# Patient Record
Sex: MALE | Race: WHITE | NOT HISPANIC OR LATINO | URBAN - METROPOLITAN AREA
[De-identification: names, ages, dates, MRNs, and addresses within clinical notes are randomized per-mention and may not be internally consistent; named-entity substitution may affect disease eponyms.]

---

## 2023-02-27 ENCOUNTER — EMERGENCY (EMERGENCY)
Facility: HOSPITAL | Age: 38
LOS: 1 days | Discharge: HOME | End: 2023-02-27
Admitting: STUDENT IN AN ORGANIZED HEALTH CARE EDUCATION/TRAINING PROGRAM
Payer: COMMERCIAL

## 2023-02-27 VITALS
OXYGEN SATURATION: 98 % | RESPIRATION RATE: 18 BRPM | SYSTOLIC BLOOD PRESSURE: 160 MMHG | DIASTOLIC BLOOD PRESSURE: 75 MMHG | HEART RATE: 118 BPM | TEMPERATURE: 98 F

## 2023-02-27 LAB
ALBUMIN SERPL ELPH-MCNC: 4 G/DL — SIGNIFICANT CHANGE UP (ref 3.3–5)
ALP SERPL-CCNC: 79 U/L — SIGNIFICANT CHANGE UP (ref 40–120)
ALT FLD-CCNC: 93 U/L — HIGH (ref 4–41)
ANION GAP SERPL CALC-SCNC: 15 MMOL/L — HIGH (ref 7–14)
APAP SERPL-MCNC: <10 UG/ML — LOW (ref 15–25)
AST SERPL-CCNC: 97 U/L — HIGH (ref 4–40)
BASOPHILS # BLD AUTO: 0.05 K/UL — SIGNIFICANT CHANGE UP (ref 0–0.2)
BASOPHILS NFR BLD AUTO: 0.6 % — SIGNIFICANT CHANGE UP (ref 0–2)
BILIRUB SERPL-MCNC: 0.9 MG/DL — SIGNIFICANT CHANGE UP (ref 0.2–1.2)
BUN SERPL-MCNC: 10 MG/DL — SIGNIFICANT CHANGE UP (ref 7–23)
CALCIUM SERPL-MCNC: 8.4 MG/DL — SIGNIFICANT CHANGE UP (ref 8.4–10.5)
CHLORIDE SERPL-SCNC: 98 MMOL/L — SIGNIFICANT CHANGE UP (ref 98–107)
CK SERPL-CCNC: 2729 U/L — HIGH (ref 30–200)
CO2 SERPL-SCNC: 22 MMOL/L — SIGNIFICANT CHANGE UP (ref 22–31)
CREAT SERPL-MCNC: 0.87 MG/DL — SIGNIFICANT CHANGE UP (ref 0.5–1.3)
EGFR: 113 ML/MIN/1.73M2 — SIGNIFICANT CHANGE UP
EOSINOPHIL # BLD AUTO: 0.04 K/UL — SIGNIFICANT CHANGE UP (ref 0–0.5)
EOSINOPHIL NFR BLD AUTO: 0.5 % — SIGNIFICANT CHANGE UP (ref 0–6)
ETHANOL SERPL-MCNC: 55 MG/DL — HIGH
FLUAV AG NPH QL: SIGNIFICANT CHANGE UP
FLUBV AG NPH QL: SIGNIFICANT CHANGE UP
GLUCOSE SERPL-MCNC: 97 MG/DL — SIGNIFICANT CHANGE UP (ref 70–99)
HCT VFR BLD CALC: 38.4 % — LOW (ref 39–50)
HGB BLD-MCNC: 13 G/DL — SIGNIFICANT CHANGE UP (ref 13–17)
IANC: 6.52 K/UL — SIGNIFICANT CHANGE UP (ref 1.8–7.4)
IMM GRANULOCYTES NFR BLD AUTO: 0.5 % — SIGNIFICANT CHANGE UP (ref 0–0.9)
LYMPHOCYTES # BLD AUTO: 1.18 K/UL — SIGNIFICANT CHANGE UP (ref 1–3.3)
LYMPHOCYTES # BLD AUTO: 13.5 % — SIGNIFICANT CHANGE UP (ref 13–44)
MCHC RBC-ENTMCNC: 29.8 PG — SIGNIFICANT CHANGE UP (ref 27–34)
MCHC RBC-ENTMCNC: 33.9 GM/DL — SIGNIFICANT CHANGE UP (ref 32–36)
MCV RBC AUTO: 88.1 FL — SIGNIFICANT CHANGE UP (ref 80–100)
MONOCYTES # BLD AUTO: 0.88 K/UL — SIGNIFICANT CHANGE UP (ref 0–0.9)
MONOCYTES NFR BLD AUTO: 10.1 % — SIGNIFICANT CHANGE UP (ref 2–14)
NEUTROPHILS # BLD AUTO: 6.52 K/UL — SIGNIFICANT CHANGE UP (ref 1.8–7.4)
NEUTROPHILS NFR BLD AUTO: 74.8 % — SIGNIFICANT CHANGE UP (ref 43–77)
NRBC # BLD: 0 /100 WBCS — SIGNIFICANT CHANGE UP (ref 0–0)
NRBC # FLD: 0 K/UL — SIGNIFICANT CHANGE UP (ref 0–0)
PLATELET # BLD AUTO: 229 K/UL — SIGNIFICANT CHANGE UP (ref 150–400)
POTASSIUM SERPL-MCNC: 3.8 MMOL/L — SIGNIFICANT CHANGE UP (ref 3.5–5.3)
POTASSIUM SERPL-SCNC: 3.8 MMOL/L — SIGNIFICANT CHANGE UP (ref 3.5–5.3)
PROT SERPL-MCNC: 6.9 G/DL — SIGNIFICANT CHANGE UP (ref 6–8.3)
RBC # BLD: 4.36 M/UL — SIGNIFICANT CHANGE UP (ref 4.2–5.8)
RBC # FLD: 12.4 % — SIGNIFICANT CHANGE UP (ref 10.3–14.5)
RSV RNA NPH QL NAA+NON-PROBE: SIGNIFICANT CHANGE UP
SALICYLATES SERPL-MCNC: <0.3 MG/DL — LOW (ref 15–30)
SARS-COV-2 RNA SPEC QL NAA+PROBE: SIGNIFICANT CHANGE UP
SODIUM SERPL-SCNC: 135 MMOL/L — SIGNIFICANT CHANGE UP (ref 135–145)
TOXICOLOGY SCREEN, DRUGS OF ABUSE, SERUM RESULT: SIGNIFICANT CHANGE UP
TSH SERPL-MCNC: 3.12 UIU/ML — SIGNIFICANT CHANGE UP (ref 0.27–4.2)
WBC # BLD: 8.71 K/UL — SIGNIFICANT CHANGE UP (ref 3.8–10.5)
WBC # FLD AUTO: 8.71 K/UL — SIGNIFICANT CHANGE UP (ref 3.8–10.5)

## 2023-02-27 PROCEDURE — 99285 EMERGENCY DEPT VISIT HI MDM: CPT

## 2023-02-27 RX ORDER — HALOPERIDOL DECANOATE 100 MG/ML
5 INJECTION INTRAMUSCULAR ONCE
Refills: 0 | Status: COMPLETED | OUTPATIENT
Start: 2023-02-27 | End: 2023-02-27

## 2023-02-27 RX ORDER — SODIUM CHLORIDE 9 MG/ML
1000 INJECTION, SOLUTION INTRAVENOUS ONCE
Refills: 0 | Status: COMPLETED | OUTPATIENT
Start: 2023-02-27 | End: 2023-02-27

## 2023-02-27 RX ORDER — SODIUM CHLORIDE 9 MG/ML
1000 INJECTION INTRAMUSCULAR; INTRAVENOUS; SUBCUTANEOUS ONCE
Refills: 0 | Status: COMPLETED | OUTPATIENT
Start: 2023-02-27 | End: 2023-02-27

## 2023-02-27 RX ADMIN — HALOPERIDOL DECANOATE 5 MILLIGRAM(S): 100 INJECTION INTRAMUSCULAR at 19:00

## 2023-02-27 RX ADMIN — SODIUM CHLORIDE 1000 MILLILITER(S): 9 INJECTION, SOLUTION INTRAVENOUS at 22:07

## 2023-02-27 RX ADMIN — Medication 2 MILLIGRAM(S): at 19:00

## 2023-02-27 RX ADMIN — SODIUM CHLORIDE 1000 MILLILITER(S): 9 INJECTION INTRAMUSCULAR; INTRAVENOUS; SUBCUTANEOUS at 22:07

## 2023-02-27 NOTE — ED ADULT NURSE NOTE - CAS EDN DISCHARGE ASSESSMENT
pt is clear for DC by provider, steady gait, calm and cooperative/Alert and oriented to person, place and time

## 2023-02-27 NOTE — ED ADULT TRIAGE NOTE - CHIEF COMPLAINT QUOTE
p/t found running around in the bar with tea shirt and no shoes, p/t denies any alcohol or drug use, appears very anxious, refusing to answer questions, no signs of any trauma noted

## 2023-02-27 NOTE — ED PROVIDER NOTE - OBJECTIVE STATEMENT
This is a 37 yr old M, [facility] This is a 37 yr old M, pmh unkown pt arrived with ems and pd from a bar with c/o bizarre behaviour This is a 37 yr old M, pmh unknown pt arrived with ems and pd from a bar with c/o bizarre behaviour, acting out behaviour. 911 was activated by 3rd party. PD reports upon their arrival to the scene they were able to establish good rapport with patient, who appeared under the influence. He was restless going from mellow to restless, but no physical agitation toward officers. Upon arrival to behavioural health he is barefoot, restless, and anxious, intermittently follows direction. Reports took some Adderall today. Usually he does not do so. Unable to engage further meaningful evaluation. Appears under the influence. This is a 37 yr old M, pmh unknown pt arrived with ems and pd from a bar with c/o bizarre behaviour, acting out behaviour. 911 was activated by 3rd party. PD reports upon their arrival to the scene they were able to establish good rapport with patient, who appeared under the influence. He was restless going from mellow to restless, but no physical agitation toward officers. Upon arrival to behavioural health he is barefoot, restless, and anxious, intermittently follows direction. Reports took some Adderall today. Usually he does not do so. Unable to engage further meaningful evaluation. Appears under the influence.    Marta Hx: Mother "Yuli" home phone [phone], cell phone [phone]

## 2023-02-27 NOTE — ED PROVIDER NOTE - PATIENT PORTAL LINK FT
You can access the FollowMyHealth Patient Portal offered by [facility] by registering at the following website: [email or web address]. By joining [facility]’s FollowMyHealth portal, you will also be able to view your health information using other applications (apps) compatible with our system.

## 2023-02-27 NOTE — ED PROVIDER NOTE - DIFFERENTIAL DIAGNOSIS
Differential Diagnosis psychosis, vs alcohol intoxication vs substance abuse, vs thyroid or metabolic dysfunction

## 2023-02-27 NOTE — ED PROVIDER NOTE - CLINICAL SUMMARY MEDICAL DECISION MAKING FREE TEXT BOX
This is a 37 yr old M, pmh unknown pt arrived with ems and pd from a bar with c/o bizarre behaviour, acting out behaviour. 911 was activated by 3rd party. PD reports upon their arrival to the scene they were able to establish good rapport with patient, who appeared under the influence. He was restless going from mellow to restless, but no physical agitation toward officers. Upon arrival to behavioural health he is barefoot, restless, and anxious, intermittently follows direction. Reports took some Adderall today. Usually he does not do so. Unable to engage further meaningful evaluation. Appears under the influence.  Upon arrival for restlessness offered meds and accepted. Blood work - elevated lft, ck level 2729, no kidney injury, ivf hydration given  Needs to be reassessed upon sobriety.

## 2023-02-27 NOTE — ED BEHAVIORAL HEALTH NOTE - BEHAVIORAL HEALTH NOTE
As per [employer] search "Recipient might not exist in the [employer] application, either because they are new to Medicaid or new to the Behavioral Health population."

## 2023-02-27 NOTE — ED PROVIDER NOTE - PROGRESS NOTE DETAILS
Pt awake when examiner enters room. When examiner asks about what happened last night. Pt closes eyes and starts making snoring sounds. Reassess in AM. JOVON. Pt awake and cooperative, but jittery and startles easily and looses train of thought. Pt states he is from [place]. He was working out and lost his shoes and went to the bar. Said he drank a beer. Denies illicit drug use. Gave mother's contact number. States his plan is to fly home to [place] today.    Mother: Pt was reported as missing person yesterday. He was last seen Friday. Pt was planning to fly to [place] to stay with a friend to get away. Does not know anyone in [facility]. Pt has one psychiatric admission for "loosing touch with reality" when he was 18. No formal psych diagnoses. Pt gets "manic" NP [facility]- psych recommendation out patient follow up, There is no clinical evidence of intoxication, or any acute medical problem requiring immediate intervention.  At present time he is  not a harm to self or others and can be safely discharge to follow up with psychiatrist.

## 2023-02-27 NOTE — ED PROVIDER NOTE - PHYSICAL EXAMINATION
+ multiple blisters to right foot , right 5th finger and base of the right 1st  metatarsal joint   + blister to base of the left 1st  metatarsal joint

## 2023-02-28 VITALS
OXYGEN SATURATION: 100 % | TEMPERATURE: 98 F | HEART RATE: 95 BPM | DIASTOLIC BLOOD PRESSURE: 84 MMHG | SYSTOLIC BLOOD PRESSURE: 110 MMHG | RESPIRATION RATE: 18 BRPM

## 2023-02-28 DIAGNOSIS — F19.94 OTHER PSYCHOACTIVE SUBSTANCE USE, UNSPECIFIED WITH PSYCHOACTIVE SUBSTANCE-INDUCED MOOD DISORDER: ICD-10-CM

## 2023-02-28 LAB
AMPHET UR-MCNC: NEGATIVE — SIGNIFICANT CHANGE UP
APPEARANCE UR: CLEAR — SIGNIFICANT CHANGE UP
BARBITURATES UR SCN-MCNC: NEGATIVE — SIGNIFICANT CHANGE UP
BENZODIAZ UR-MCNC: NEGATIVE — SIGNIFICANT CHANGE UP
BILIRUB UR-MCNC: NEGATIVE — SIGNIFICANT CHANGE UP
CK SERPL-CCNC: 1615 U/L — HIGH (ref 30–200)
COCAINE METAB.OTHER UR-MCNC: NEGATIVE — SIGNIFICANT CHANGE UP
COLOR SPEC: SIGNIFICANT CHANGE UP
COVID-19 SPIKE DOMAIN AB INTERP: POSITIVE
COVID-19 SPIKE DOMAIN ANTIBODY RESULT: >250 U/ML — HIGH
CREATININE URINE RESULT, DAU: 67 MG/DL — SIGNIFICANT CHANGE UP
DIFF PNL FLD: NEGATIVE — SIGNIFICANT CHANGE UP
GLUCOSE UR QL: NEGATIVE — SIGNIFICANT CHANGE UP
KETONES UR-MCNC: ABNORMAL
LEUKOCYTE ESTERASE UR-ACNC: NEGATIVE — SIGNIFICANT CHANGE UP
METHADONE UR-MCNC: NEGATIVE — SIGNIFICANT CHANGE UP
NITRITE UR-MCNC: NEGATIVE — SIGNIFICANT CHANGE UP
OPIATES UR-MCNC: NEGATIVE — SIGNIFICANT CHANGE UP
OXYCODONE UR-MCNC: NEGATIVE — SIGNIFICANT CHANGE UP
PCP SPEC-MCNC: SIGNIFICANT CHANGE UP
PCP UR-MCNC: NEGATIVE — SIGNIFICANT CHANGE UP
PH UR: 6 — SIGNIFICANT CHANGE UP (ref 5–8)
PROT UR-MCNC: NEGATIVE — SIGNIFICANT CHANGE UP
SARS-COV-2 IGG+IGM SERPL QL IA: >250 U/ML — HIGH
SARS-COV-2 IGG+IGM SERPL QL IA: POSITIVE
SP GR SPEC: 1.01 — SIGNIFICANT CHANGE UP (ref 1.01–1.05)
THC UR QL: NEGATIVE — SIGNIFICANT CHANGE UP
UROBILINOGEN FLD QL: SIGNIFICANT CHANGE UP

## 2023-02-28 PROCEDURE — 90792 PSYCH DIAG EVAL W/MED SRVCS: CPT

## 2023-02-28 RX ADMIN — Medication 1 MILLIGRAM(S): at 09:33

## 2023-02-28 NOTE — ED BEHAVIORAL HEALTH NOTE - BEHAVIORAL HEALTH NOTE
Worker called pt’s mother Yuli for collateral information ([phone]). All information is as per mother:    Patient is a 37-year-old male, originally from [place], [place] and was brought into the hospital after being a missing person. Patient’s mother reports that the patient has no formal mental health diagnosis, but she thinks the patient has bipolar. She states that for the last two weeks the patient has been drinking alcohol heavily. She states that the patient went to [facility] with his girlfriend on January21 and came back January 28. When patient returned his girlfriend broke up with him because of the excessive drinking. Patient then moved in with mother in [place] and she noticed that the patient had a huge bottle of vodka. Mother states that the patient was supposed to go to [place] on Friday and Flew from [place] to [facility]. Patient never took his connecting flight to [place] as he should have. Mother states that the patient is not in the right state of mind. She states that the patient was living with his girlfriend prior to their vacation, and she is unsure how much he drinks. She states that he typically drinks alcohol on the weekends. She states that the police called her Saturday night because pt at that time could not pay 800 dollars in hotel fees. She states that the patient has no cash and no place to go. 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 Patient is not connected to a psychiatrist or is on medication. Patient’s sister has a hx of mental illness – schizoaffective disorder.  Patient has no SI. Patient is not using any other drugs. Patient’s mother placed a missing report for the patient because she has not heard from him in 24 hrs. She states that the patient was hospitalized psychiatrically when he was college for having an episode. At that time the patient was not sleeping and lost touch with reality. She states that the patient is going to stay manic when he stops drinking. Patient has never been to rehab. Patient at baseline is not violent or aggressive. Mother states that he could possibly become aggressive. Mother states that the patient presented 5 years ago with jazlyn. She states he went to [place] and spent his money, and he was drunk for a week and ended up with a DUI and end up in group home. She states that the patient’s credit card and phone is not working. Case discussed with psychiatry. Worker called pt’s mother Yuli for collateral information ([phone]). All information is as per mother:    Patient is a 37-year-old male, originally from [place], [place] and was brought into the hospital after being a missing person. Patient’s mother reports that the patient has no formal mental health diagnosis, but she thinks the patient has bipolar. She states that for the last two weeks the patient has been drinking alcohol heavily. She states that the patient went to [facility] with his girlfriend on January21 and came back January 28. When patient returned his girlfriend broke up with him because of the excessive drinking. Patient then moved in with mother in [place] and she noticed that the patient had a huge bottle of vodka. Mother states that the patient was supposed to go to [place] on Friday and Flew from [place] to [facility]. Patient never took his connecting flight to [place] as he should have. Mother states that the patient is not in the right state of mind. She states that the patient was living with his girlfriend prior to their vacation, and she is unsure how much he drinks. She states that he typically drinks alcohol on the weekends. She states that the police called her Saturday night because pt at that time could not pay 800 dollars in hotel fees. She states that the patient has no cash and no place to go. 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 Patient is not connected to a psychiatrist or is on medication. Patient’s sister has a hx of mental illness – schizoaffective disorder.  Patient has no SI. Patient is not using any other drugs. Patient’s mother placed a missing report for the patient because she has not heard from him in 24 hrs. She states that the patient was hospitalized psychiatrically when he was college for having an episode. At that time the patient was not sleeping and lost touch with reality. She states that the patient is going to stay manic when he stops drinking. Patient has never been to rehab. Patient at baseline is not violent or aggressive. Mother states that he could possibly become aggressive. Mother states that the patient presented 5 years ago with jazlyn. She states he went to [place] and spent his money, and he was drunk for a week and ended up with a DUI and end up in prison. She states that the patient’s credit card and phone is not working. Case discussed with psychiatry.  Patient is cleared psychiatrically. Mother will - eta 6-7 hours. Patient's mother will bring coat and shoes.

## 2023-02-28 NOTE — ED BEHAVIORAL HEALTH ASSESSMENT NOTE - DETAILS
no available charts uncles - commited suicide, sister - depression and anxiety none no evidence of SI/I/P, pt future oriented, no hx of SA. uncles -  by suicide, sister - depression and anxiety mother aware of plan, discussed with Dr. Garcia

## 2023-02-28 NOTE — ED BEHAVIORAL HEALTH ASSESSMENT NOTE - SUMMARY
37yr old male, domiciled with family in [place], [place], employed as a  with an unclear past psych hx but has had "jazlyn" while intoxicated with alcohol, has one past psych hx at age of 17, and multiple ED visits for agitation s/p alcohol use, no past SA, no past nssib, no trauma hx, was BIB EMS activated by an unknown person as patient was agitated and acting bizarre.   Patient presented intoxicated on both Adderall and alcohol and was agitated upon arrival requiring IMs of Ativan 2mg and Haldol 5mg. He was found to have a BAL of 55 and a CK of 2729. Upon re-assessment in the AM, patient was calm, cooperative and mostly linear in TP. He exhibited lapses in his memory most likely secondary to alcohol use and admitted that in the past alcohol has caused him to "get in trouble". He does not present as floridly psychotic, does not appear internally preoccupied, denies any AH/VH, and denies any SI/I/P. As per mother, she reports that when he engages in alcohol use, he exhibits "manic" behaviors and she's concerned that is his current state. It was explained to mother that patient is currently not exhibiting such behaviors and she was agreeable with his returning home and will come and pick him up.   Pt would most benefit from rehabilitation services for substance use and was offered referrals however he declined. No indication for acute inpatient psychiatric treatment at this time given above.

## 2023-02-28 NOTE — ED ADULT NURSE REASSESSMENT NOTE - NS ED NURSE REASSESS COMMENT FT1
Pt is sleeping in bed, NAD, even unlabored respirations observed. VSS. Pending medical reassessment and dispo when awake and cooperative. Will continue to monitor for safety.
Received pt from RN break coverage. Pt is sleeping in bed, NAD, even unlabored respirations observed. VSS. Will continue to monitor for safety.
Received pt from night shift. Pt currently calm, resting well in  room 5. Vitals wnl. Will continue monitoring.
Repeat CK draw completed as ordered, pt calm and cooperative. VSS. Pending MC and dispo. Will continue to monitor for safety.
Received pt and report at change of shift. Pt is currently laying in bed, sleeping, NAD. VSS. Pending medical reassessment when clinically sober. Will continue to monitor for safety.

## 2023-02-28 NOTE — ED BEHAVIORAL HEALTH ASSESSMENT NOTE - DESCRIPTION
initially agitated upon arrival and required Ativan and Haldol. Pt slept overnight, and this morning was calm and cooperative. Requested medicine for anxiety and received Ativan 1mg po with positive effect. none initially agitated upon arrival and required Ativan and Haldol. Pt slept overnight, and this morning was calm and cooperative. Requested medicine for anxiety and received Ativan 1mg po with positive effect.  Vital Signs Last 24 Hrs  T(C): 36.7 (28 Feb 2023 04:22), Max: 36.9 (27 Feb 2023 23:39)  T(F): 98 (28 Feb 2023 04:22), Max: 98.4 (27 Feb 2023 23:39)  HR: 94 (28 Feb 2023 04:22) (94 - 118)  BP: 121/80 (28 Feb 2023 04:22) (107/60 - 160/75)  BP(mean): --  RR: 18 (28 Feb 2023 04:22) (18 - 18)  SpO2: 99% (28 Feb 2023 04:22) (98% - 99%)    Parameters below as of 28 Feb 2023 04:22  Patient On (Oxygen Delivery Method): room air lives in [place] with family, works as a  - virtual job, no kids, not

## 2023-02-28 NOTE — ED BEHAVIORAL HEALTH ASSESSMENT NOTE - HPI (INCLUDE ILLNESS QUALITY, SEVERITY, DURATION, TIMING, CONTEXT, MODIFYING FACTORS, ASSOCIATED SIGNS AND SYMPTOMS)
37yr old male, domiciled with family in [place], [place], employed as a  with an unclear past psych hx but has had "jazlyn" while intoxicated with alcohol, has one past psych hx at age of 17, and multiple ED visits for agitation s/p alcohol use, no trauma hx, was BIB EMS activated by an unknown person as patient was agitated and acting bizarre.     Patient was given Haldol 5mg and Ativan 2mg IM at 7pm on 02/27/2023 for agitation in the ED. He presented to the ED visibly intoxicated and admitted to Adderall use as well as alcohol use (BAL 55). Pt had no shoes on and had blisters on his feet. Pt found to have a CK of 2729 and after 2L of fluids CK trended down to 1615.     Psych consult was called at 8am as mother reported patient was manic and she filed a missing person's report as she had not heard from him in 24 hours.     Patient reports that he came to [place] from [place] on 02/24/2023 to ultimately go to [place] as he wanted to go on break for about 6 weeks to "get away". He states that over the last 9 months he has been struggling because his sister was hospitalized for mental health issues, him and his girlfriend broke up and he just wanted to go on break away from his family. He states that since coming to [place] he has been shopping, "exercising", and eating but lost his phone at the first hotel he stayed at as well as ended up with fraudulent charges so he reports not having any money. He states he was told to leave the [facility] over a $47 tab and ended up staying at the [place]. He is unable to provide details on how he lost his phone or how his cards got "frauded" and does not want to admit that he had been intoxicated when these events occurred. Patient does admit that in the past he has had "issues" with alcohol and can stop when it becomes a problem but refuses to engage in any discussion regarding substance rehab. When patient was asked how he ended up in [place] from [place] (where he was staying) he states he went walking and "exercised". When asked what happened to his shoes, he stated he did not know.     Patient denies any hallucinations, does not report any delusional thought content, denies thought insertion/withdrawal, denies referential thought processes & is not paranoid on interview. Pt is linear however does not remember a lot of details from the last 24 hours. Patient does not report nor exhibit any signs of jazlyn, including irritable (is able to keep appropriate self control and make his needs known), no evidence of elevated mood, grandiosity, pressured speech, risk-taking behaviors, increase in productivity or agitation. Patient denies any depressive symptoms including depressed mood, anhedonia, changes in energy/concentration/appetite, sleep disturbances, preoccupation with death or feelings of guilt. Patient adamantly denies SI, intent or plan; denies any HI, violent thoughts.    Collateral from Mom in  Note.     Writer spoke with mom who reports that she feels patient is manic and she's worried he will hurt himself or someone else but denies that he's been making any suicidal statements. She was agreeable with discharge as long as patient will remain in ED until she can get him from ER as he has no money or no shoes. She will come pick him up from [place]. Patient agreeable with this plan. 37yr old male, domiciled with family in [place], [place], employed as a  with an unclear past psych hx but has had "jazlyn" while intoxicated with alcohol, has one past psych hx at age of 17, and multiple ED visits for agitation s/p alcohol use, no past SA, no past nssib, no trauma hx, was BIB EMS activated by an unknown person as patient was agitated and acting bizarre.   Patient was given Haldol 5mg and Ativan 2mg IM at 7pm on 02/27/2023 for agitation in the ED. He presented to the ED visibly intoxicated and admitted to Adderall use as well as alcohol use (BAL 55). Pt had no shoes on and had blisters on his feet. Pt found to have a CK of 2729 and after 2L of fluids CK trended down to 1615.     Psych consult was called at 8am as mother reported patient was manic and she filed a missing person's report as she had not heard from him in 24 hours.     Patient reports that he came to [place] from [place] on 02/24/2023 to ultimately go to [place] as he wanted to go on break for about 6 weeks to "get away". He states that over the last 9 months he has been struggling because his sister was hospitalized for mental health issues, him and his girlfriend broke up and he just wanted to go on break away from his family. He states that since coming to [place] he has been shopping, "exercising", and eating but lost his phone at the first hotel he stayed at as well as ended up with fraudulent charges so he reports not having any money. He states he was told to leave the [facility] over a $47 tab and ended up staying at the [place]. He is unable to provide details on how he lost his phone or how his cards got "frauded" and does not want to admit that he had been intoxicated when these events occurred. Patient does admit that in the past he has had "issues" with alcohol and can stop when it becomes a problem but refuses to engage in any discussion regarding substance rehab. When patient was asked how he ended up in [place] from [place] (where he was staying) he states he went walking and "exercised". When asked what happened to his shoes, he stated he did not know.     Patient denies any hallucinations, does not report any delusional thought content, denies thought insertion/withdrawal, denies referential thought processes & is not paranoid on interview. Pt is linear however does not remember a lot of details from the last 24 hours. Patient does not report nor exhibit any signs of jazlyn, including irritable (is able to keep appropriate self control and make his needs known), no evidence of elevated mood, grandiosity, pressured speech, risk-taking behaviors, increase in productivity or agitation. Patient denies any depressive symptoms including depressed mood, anhedonia, changes in energy/concentration/appetite, sleep disturbances, preoccupation with death or feelings of guilt. Patient adamantly denies SI, intent or plan; denies any HI, violent thoughts.    Collateral from Mom in  Note.     Writer spoke with mom who reports that she feels patient is manic and she's worried he will hurt himself or someone else but denies that he's been making any suicidal statements. She was agreeable with discharge as long as patient will remain in ED until she can get him from ER as he has no money or no shoes. She will come pick him up from [place]. Patient agreeable with this plan.

## 2023-02-28 NOTE — ED BEHAVIORAL HEALTH ASSESSMENT NOTE - OTHER PAST PSYCHIATRIC HISTORY (INCLUDE DETAILS REGARDING ONSET, COURSE OF ILLNESS, INPATIENT/OUTPATIENT TREATMENT)
had been hospitalized at the age of 17 years old for "not being in touch with reality".   no current psych provider  no past SA, no nssib

## 2023-02-28 NOTE — ED BEHAVIORAL HEALTH ASSESSMENT NOTE - RISK ASSESSMENT
risk factors include substance use, recent psychosocial stressors, minimal insight into need for treatment, hx of hospitalizations and recent agitation. Protective factors include supportive family, access to care, engaged in work, no past SA, no hx of nssib, no evidence of psychosis, no current evidence of jazlyn and pt is future oriented. At this time he is not presenting as an imminent danger to himself or anyone else and does not meet criteria for involuntary psych hospitalization. He would benefit from substance treatment though patient declines.